# Patient Record
Sex: MALE | Race: WHITE | NOT HISPANIC OR LATINO | Employment: FULL TIME | URBAN - METROPOLITAN AREA
[De-identification: names, ages, dates, MRNs, and addresses within clinical notes are randomized per-mention and may not be internally consistent; named-entity substitution may affect disease eponyms.]

---

## 2023-10-11 PROBLEM — S76.109A INJURY OF QUADRICEPS TENDON: Status: ACTIVE | Noted: 2023-10-11

## 2023-10-11 PROBLEM — M25.669 KNEE STIFFNESS: Status: ACTIVE | Noted: 2023-10-11

## 2023-10-11 PROBLEM — R31.9 HEMATURIA: Status: ACTIVE | Noted: 2023-10-11

## 2023-10-11 PROBLEM — R00.2 PALPITATIONS: Status: ACTIVE | Noted: 2023-10-11

## 2023-10-11 PROBLEM — I10 HYPERTENSION: Status: ACTIVE | Noted: 2023-10-11

## 2023-10-11 PROBLEM — M25.569 KNEE PAIN: Status: ACTIVE | Noted: 2023-10-11

## 2023-10-11 PROBLEM — S76.111D QUADRICEPS TENDON RUPTURE, RIGHT, SUBSEQUENT ENCOUNTER: Status: ACTIVE | Noted: 2023-10-11

## 2023-10-11 PROBLEM — E78.5 HYPERLIPIDEMIA: Status: ACTIVE | Noted: 2023-10-11

## 2023-10-11 PROBLEM — R10.31 RIGHT GROIN PAIN: Status: ACTIVE | Noted: 2023-10-11

## 2023-10-11 PROBLEM — R26.2 DIFFICULTY WALKING: Status: ACTIVE | Noted: 2023-10-11

## 2023-10-11 PROBLEM — N20.1 URETERAL STONE: Status: ACTIVE | Noted: 2023-10-11

## 2023-10-11 RX ORDER — LORAZEPAM 0.5 MG/1
0.5 TABLET ORAL EVERY 8 HOURS PRN
COMMUNITY
Start: 2018-07-22

## 2023-10-11 RX ORDER — ATORVASTATIN CALCIUM 40 MG/1
1 TABLET, FILM COATED ORAL NIGHTLY
COMMUNITY
Start: 2018-08-01

## 2023-10-11 RX ORDER — OXYCODONE AND ACETAMINOPHEN 5; 325 MG/1; MG/1
1 TABLET ORAL EVERY 6 HOURS PRN
COMMUNITY
Start: 2018-02-09

## 2023-10-11 RX ORDER — ASPIRIN 81 MG/1
TABLET ORAL
COMMUNITY

## 2023-10-11 RX ORDER — IBUPROFEN 600 MG/1
600 TABLET ORAL EVERY 6 HOURS PRN
COMMUNITY
Start: 2018-02-09

## 2023-10-11 RX ORDER — AMLODIPINE BESYLATE 2.5 MG/1
1 TABLET ORAL DAILY
COMMUNITY
Start: 2018-07-25

## 2023-10-11 RX ORDER — DOCUSATE SODIUM 100 MG/1
100 CAPSULE, LIQUID FILLED ORAL 2 TIMES DAILY
COMMUNITY
Start: 2018-02-09

## 2023-10-11 RX ORDER — SODIUM, POTASSIUM,MAG SULFATES 17.5-3.13G
SOLUTION, RECONSTITUTED, ORAL ORAL
COMMUNITY
Start: 2015-12-09

## 2023-10-12 ENCOUNTER — TREATMENT (OUTPATIENT)
Dept: PHYSICAL THERAPY | Facility: HOSPITAL | Age: 63
End: 2023-10-12
Payer: COMMERCIAL

## 2023-10-12 DIAGNOSIS — M25.569 KNEE PAIN: ICD-10-CM

## 2023-10-12 DIAGNOSIS — S76.109A INJURY OF QUADRICEPS TENDON: ICD-10-CM

## 2023-10-12 DIAGNOSIS — M25.661 STIFFNESS OF RIGHT KNEE: ICD-10-CM

## 2023-10-12 DIAGNOSIS — S76.111D QUADRICEPS TENDON RUPTURE, RIGHT, SUBSEQUENT ENCOUNTER: Primary | ICD-10-CM

## 2023-10-12 PROCEDURE — 97110 THERAPEUTIC EXERCISES: CPT | Mod: GP | Performed by: PHYSICAL THERAPIST

## 2023-10-12 ASSESSMENT — ENCOUNTER SYMPTOMS
LOSS OF SENSATION IN FEET: 0
DEPRESSION: 0
OCCASIONAL FEELINGS OF UNSTEADINESS: 0

## 2023-10-12 ASSESSMENT — PAIN SCALES - GENERAL: PAINLEVEL_OUTOF10: 0 - NO PAIN

## 2023-10-12 ASSESSMENT — PAIN - FUNCTIONAL ASSESSMENT: PAIN_FUNCTIONAL_ASSESSMENT: 0-10

## 2023-10-12 NOTE — PROGRESS NOTES
"  Physical Therapy  Physical Therapy Treatment Note    Patient Name: Albaro Tony  MRN: [id number]  Today's Date: 10/12/2023  Time Calculation  Start Time: 1110  Stop Time: 1155  Time Calculation (min): 45 min    Insurance:  Visit number: 24 of 40  Authorization info: 40 visit limit  Insurance Type: MMO    General:  Reason for visit: s/p quadriceps tendon repair  Referred by: Dr. Dorman    Assessment:   Patient tolerated today's session w/ no issues. Demonstrates improvements in knee eccentric control and quadriceps strength displayed through improved performance of there-ex. Patient will benefit from ongoing PT services to continue to progress quadriceps strength and knee control as tolerated per post-op protocol.        Plan:  Progress quadriceps and hamstring strengthening as tolerated.      Current Problem  1. Quadriceps tendon rupture, right, subsequent encounter        2. Stiffness of right knee        3. Knee pain        4. Injury of quadriceps tendon            Precautions:   0-90 degrees 1st 8 weeks, 50% WB first 6 weeks then WBAT.     Subjective:     Patient reports he has not had any new issues occur since his last visit. Feels his knee strength is improving but is still having difficulty w/ descending stairs.     Pain  Pain Assessment: 0-10  Pain Score: 0 - No pain    Performing HEP?: Yes      Objective:   R Knee AROM  Extension: WNL  Flexion: 120      Treatment Performed:    Therapeutic Exercise:    45 min  Elliptical 5 minutes   DL leg extension w/ 45lbs 2x12, 2x12 w/ 55lbs  Step ups to 8\" box 4x12  Total gym hack squats w/ 2 band resistance 4x12  RDLs from 8\" box w/ 3lbs discontinued due to technique  S/L clamshells w/ green TB around knees 3x12 per side      Ge Wilks, S-PT   "

## 2023-10-17 ENCOUNTER — APPOINTMENT (OUTPATIENT)
Dept: PHYSICAL THERAPY | Facility: HOSPITAL | Age: 63
End: 2023-10-17
Payer: COMMERCIAL

## 2023-10-19 ENCOUNTER — APPOINTMENT (OUTPATIENT)
Dept: PHYSICAL THERAPY | Facility: HOSPITAL | Age: 63
End: 2023-10-19
Payer: COMMERCIAL

## 2023-10-20 ENCOUNTER — TREATMENT (OUTPATIENT)
Dept: PHYSICAL THERAPY | Facility: HOSPITAL | Age: 63
End: 2023-10-20
Payer: COMMERCIAL

## 2023-10-20 DIAGNOSIS — S76.111D QUADRICEPS TENDON RUPTURE, RIGHT, SUBSEQUENT ENCOUNTER: Primary | ICD-10-CM

## 2023-10-20 DIAGNOSIS — M25.569 KNEE PAIN: ICD-10-CM

## 2023-10-20 DIAGNOSIS — M25.661 STIFFNESS OF RIGHT KNEE: ICD-10-CM

## 2023-10-20 DIAGNOSIS — S76.109A INJURY OF QUADRICEPS TENDON: ICD-10-CM

## 2023-10-20 PROCEDURE — 97110 THERAPEUTIC EXERCISES: CPT | Mod: GP | Performed by: PHYSICAL THERAPIST

## 2023-10-20 ASSESSMENT — PAIN SCALES - GENERAL: PAINLEVEL_OUTOF10: 0 - NO PAIN

## 2023-10-20 ASSESSMENT — PAIN - FUNCTIONAL ASSESSMENT: PAIN_FUNCTIONAL_ASSESSMENT: 0-10

## 2023-10-20 NOTE — PROGRESS NOTES
"  Physical Therapy  Physical Therapy Treatment Note    Patient Name: Albaro Tony  MRN: [id number]  Today's Date: 10/20/2023  Time Calculation  Start Time: 1235  Stop Time: 1323  Time Calculation (min): 48 min    Insurance:  Visit number: 25 of 40  Authorization info: 40 visit limit  Insurance Type: MMO    General:  Reason for visit: s/p quadriceps tendon repair  Referred by: Dr. Dorman    Assessment:   Patient tolerated today's session w/ no issues. Demonstrates improvements in quad strength and eccentric control through performance of there-ex w/ increased resistance and no adverse reactions. Patient is doing well overall and will benefit from ongoing PT services to control to progress knee eccentric control and strength as tolerated.       Plan:  Continue strength progression as tolerated, emphasis on eccentric exercises.       Current Problem  1. Quadriceps tendon rupture, right, subsequent encounter        2. Stiffness of right knee        3. Knee pain        4. Injury of quadriceps tendon            Precautions:   0-90 degrees 1st 8 weeks, 50% WB first 6 weeks then WBAT.     Subjective:   Patient reports everything is going well overall. He has had some increased soreness after walking up and down multiple flights of stairs at [employer] for a concert. Feels he still has weakness in his affected side.     Pain  Pain Assessment: 0-10  Pain Score: 0 - No pain    Performing HEP?: Yes      Objective:   R Knee AROM  Extension: WNL  Flexion: 125      Treatment Performed:    Therapeutic Exercise:    48 min  [employer] bike 1 mile  Total gym SL squats 2x15 w/ 1 band resistance, 1x15 w/ 2 band resistance  Sled pushes 4x10 yards down and back  SL knee extensions 4x12 w/ 15lbs  DL hamstring curls w/ 45lbs 3x12  Step downs from 4\" box x15, from 6\" box 3x15 w/ verbal cues to slowly lower himself      Ge Wilks, S-PT   "

## 2023-10-27 ENCOUNTER — TREATMENT (OUTPATIENT)
Dept: PHYSICAL THERAPY | Facility: HOSPITAL | Age: 63
End: 2023-10-27
Payer: COMMERCIAL

## 2023-10-27 DIAGNOSIS — S76.109A INJURY OF QUADRICEPS TENDON: ICD-10-CM

## 2023-10-27 DIAGNOSIS — M25.569 KNEE PAIN: ICD-10-CM

## 2023-10-27 DIAGNOSIS — S76.111D QUADRICEPS TENDON RUPTURE, RIGHT, SUBSEQUENT ENCOUNTER: Primary | ICD-10-CM

## 2023-10-27 DIAGNOSIS — M25.661 STIFFNESS OF RIGHT KNEE: ICD-10-CM

## 2023-10-27 PROCEDURE — 97110 THERAPEUTIC EXERCISES: CPT | Mod: GP | Performed by: PHYSICAL THERAPIST

## 2023-10-27 ASSESSMENT — PAIN SCALES - GENERAL: PAINLEVEL_OUTOF10: 0 - NO PAIN

## 2023-10-27 ASSESSMENT — PAIN - FUNCTIONAL ASSESSMENT: PAIN_FUNCTIONAL_ASSESSMENT: 0-10

## 2024-03-14 ENCOUNTER — TELEPHONE (OUTPATIENT)
Dept: ORTHOPEDIC SURGERY | Facility: CLINIC | Age: 64
End: 2024-03-14
Payer: COMMERCIAL